# Patient Record
Sex: FEMALE | Race: WHITE | NOT HISPANIC OR LATINO | Employment: OTHER | URBAN - METROPOLITAN AREA
[De-identification: names, ages, dates, MRNs, and addresses within clinical notes are randomized per-mention and may not be internally consistent; named-entity substitution may affect disease eponyms.]

---

## 2018-08-20 ENCOUNTER — HOSPITAL ENCOUNTER (OUTPATIENT)
Dept: LAB | Facility: MEDICAL CENTER | Age: 48
End: 2018-08-20
Attending: SPECIALIST
Payer: MEDICARE

## 2018-08-20 PROCEDURE — 87591 N.GONORRHOEAE DNA AMP PROB: CPT | Mod: GA

## 2018-08-20 PROCEDURE — 87624 HPV HI-RISK TYP POOLED RSLT: CPT

## 2018-08-20 PROCEDURE — 87491 CHLMYD TRACH DNA AMP PROBE: CPT | Mod: GA

## 2018-08-20 PROCEDURE — 88175 CYTOPATH C/V AUTO FLUID REDO: CPT

## 2018-08-21 LAB
C TRACH DNA GENITAL QL NAA+PROBE: NEGATIVE
CYTOLOGY REG CYTOL: NORMAL
HPV HR 12 DNA CVX QL NAA+PROBE: NEGATIVE
HPV16 DNA SPEC QL NAA+PROBE: NEGATIVE
HPV18 DNA SPEC QL NAA+PROBE: NEGATIVE
N GONORRHOEA DNA GENITAL QL NAA+PROBE: NEGATIVE
SPECIMEN SOURCE: NORMAL
SPECIMEN SOURCE: NORMAL

## 2019-08-02 ENCOUNTER — APPOINTMENT (OUTPATIENT)
Dept: RADIOLOGY | Facility: MEDICAL CENTER | Age: 49
End: 2019-08-02
Attending: EMERGENCY MEDICINE
Payer: MEDICARE

## 2019-08-02 ENCOUNTER — HOSPITAL ENCOUNTER (EMERGENCY)
Facility: MEDICAL CENTER | Age: 49
End: 2019-08-02
Attending: EMERGENCY MEDICINE
Payer: MEDICARE

## 2019-08-02 VITALS
OXYGEN SATURATION: 95 % | DIASTOLIC BLOOD PRESSURE: 72 MMHG | BODY MASS INDEX: 35.74 KG/M2 | WEIGHT: 194.22 LBS | HEART RATE: 72 BPM | HEIGHT: 62 IN | TEMPERATURE: 97.6 F | RESPIRATION RATE: 16 BRPM | SYSTOLIC BLOOD PRESSURE: 111 MMHG

## 2019-08-02 DIAGNOSIS — K29.00 ACUTE GASTRITIS WITHOUT HEMORRHAGE, UNSPECIFIED GASTRITIS TYPE: ICD-10-CM

## 2019-08-02 DIAGNOSIS — R10.13 EPIGASTRIC PAIN: ICD-10-CM

## 2019-08-02 LAB
ALBUMIN SERPL BCP-MCNC: 4.5 G/DL (ref 3.2–4.9)
ALBUMIN/GLOB SERPL: 1.4 G/DL
ALP SERPL-CCNC: 66 U/L (ref 30–99)
ALT SERPL-CCNC: 41 U/L (ref 2–50)
ANION GAP SERPL CALC-SCNC: 12 MMOL/L (ref 0–11.9)
AST SERPL-CCNC: 24 U/L (ref 12–45)
BASOPHILS # BLD AUTO: 0.4 % (ref 0–1.8)
BASOPHILS # BLD: 0.02 K/UL (ref 0–0.12)
BILIRUB SERPL-MCNC: 1.1 MG/DL (ref 0.1–1.5)
BUN SERPL-MCNC: 14 MG/DL (ref 8–22)
CALCIUM SERPL-MCNC: 9.5 MG/DL (ref 8.4–10.2)
CHLORIDE SERPL-SCNC: 101 MMOL/L (ref 96–112)
CO2 SERPL-SCNC: 24 MMOL/L (ref 20–33)
CREAT SERPL-MCNC: 0.98 MG/DL (ref 0.5–1.4)
EKG IMPRESSION: NORMAL
EOSINOPHIL # BLD AUTO: 0.03 K/UL (ref 0–0.51)
EOSINOPHIL NFR BLD: 0.6 % (ref 0–6.9)
ERYTHROCYTE [DISTWIDTH] IN BLOOD BY AUTOMATED COUNT: 42.8 FL (ref 35.9–50)
GLOBULIN SER CALC-MCNC: 3.2 G/DL (ref 1.9–3.5)
GLUCOSE SERPL-MCNC: 122 MG/DL (ref 65–99)
HCT VFR BLD AUTO: 48.5 % (ref 37–47)
HGB BLD-MCNC: 16.2 G/DL (ref 12–16)
IMM GRANULOCYTES # BLD AUTO: 0.01 K/UL (ref 0–0.11)
IMM GRANULOCYTES NFR BLD AUTO: 0.2 % (ref 0–0.9)
LYMPHOCYTES # BLD AUTO: 1.32 K/UL (ref 1–4.8)
LYMPHOCYTES NFR BLD: 26.1 % (ref 22–41)
MCH RBC QN AUTO: 29 PG (ref 27–33)
MCHC RBC AUTO-ENTMCNC: 33.4 G/DL (ref 33.6–35)
MCV RBC AUTO: 86.8 FL (ref 81.4–97.8)
MONOCYTES # BLD AUTO: 0.36 K/UL (ref 0–0.85)
MONOCYTES NFR BLD AUTO: 7.1 % (ref 0–13.4)
NEUTROPHILS # BLD AUTO: 3.31 K/UL (ref 2–7.15)
NEUTROPHILS NFR BLD: 65.6 % (ref 44–72)
NRBC # BLD AUTO: 0 K/UL
NRBC BLD-RTO: 0 /100 WBC
PLATELET # BLD AUTO: 205 K/UL (ref 164–446)
PMV BLD AUTO: 9.5 FL (ref 9–12.9)
POTASSIUM SERPL-SCNC: 3.9 MMOL/L (ref 3.6–5.5)
PROT SERPL-MCNC: 7.7 G/DL (ref 6–8.2)
RBC # BLD AUTO: 5.59 M/UL (ref 4.2–5.4)
SODIUM SERPL-SCNC: 137 MMOL/L (ref 135–145)
TROPONIN T SERPL-MCNC: <6 NG/L (ref 6–19)
TROPONIN T SERPL-MCNC: <6 NG/L (ref 6–19)
WBC # BLD AUTO: 5.1 K/UL (ref 4.8–10.8)

## 2019-08-02 PROCEDURE — 84484 ASSAY OF TROPONIN QUANT: CPT

## 2019-08-02 PROCEDURE — 85025 COMPLETE CBC W/AUTO DIFF WBC: CPT

## 2019-08-02 PROCEDURE — 93005 ELECTROCARDIOGRAM TRACING: CPT | Performed by: EMERGENCY MEDICINE

## 2019-08-02 PROCEDURE — 93005 ELECTROCARDIOGRAM TRACING: CPT

## 2019-08-02 PROCEDURE — 99285 EMERGENCY DEPT VISIT HI MDM: CPT

## 2019-08-02 PROCEDURE — 71045 X-RAY EXAM CHEST 1 VIEW: CPT

## 2019-08-02 PROCEDURE — 36415 COLL VENOUS BLD VENIPUNCTURE: CPT

## 2019-08-02 PROCEDURE — 700102 HCHG RX REV CODE 250 W/ 637 OVERRIDE(OP): Performed by: EMERGENCY MEDICINE

## 2019-08-02 PROCEDURE — 80053 COMPREHEN METABOLIC PANEL: CPT

## 2019-08-02 PROCEDURE — A9270 NON-COVERED ITEM OR SERVICE: HCPCS | Performed by: EMERGENCY MEDICINE

## 2019-08-02 RX ORDER — LORAZEPAM 1 MG/1
1 TABLET ORAL
Status: SHIPPED | COMMUNITY
End: 2019-11-28

## 2019-08-02 RX ORDER — OMEPRAZOLE 20 MG/1
20 CAPSULE, DELAYED RELEASE ORAL DAILY
Qty: 30 CAP | Refills: 0 | Status: SHIPPED | OUTPATIENT
Start: 2019-08-02 | End: 2019-11-28

## 2019-08-02 RX ADMIN — LIDOCAINE HYDROCHLORIDE 30 ML: 20 SOLUTION OROPHARYNGEAL at 07:10

## 2019-08-02 ASSESSMENT — ENCOUNTER SYMPTOMS
VOMITING: 1
COUGH: 0
FLANK PAIN: 0
CONSTIPATION: 0
PALPITATIONS: 0
CHILLS: 0
BACK PAIN: 1
FALLS: 0
ABDOMINAL PAIN: 1
HEADACHES: 0
DIARRHEA: 0
NAUSEA: 1
SORE THROAT: 0
FEVER: 0
SHORTNESS OF BREATH: 0

## 2019-08-02 NOTE — ED PROVIDER NOTES
ED Provider Note    ED Provider Note    Primary care provider: Violet Mccoy M.D.  Means of arrival: POV  History obtained from: Patient  History limited by: None    CHIEF COMPLAINT  Chief Complaint   Patient presents with   • Chest Pain     mid chest, pain started day before yesterday after throwing up, pt has beeing taking gas-x w/ no relief. denies any n/v, SOB or radiation of pain        HPI  Maggie Milligan is a 48 y.o. female who presents to the Emergency Department with a chief complaint of epigastric pain.  Despite triage note, patient does not complain of chest pain to me.  She states that she never drinks, but on Wednesday evening for reasons that are unclear to her, she states she went to the store about 2 beers came home and drank them.  She then had eggs and later that evening, vomited.  Since then she has been having epigastric pain.  She is been taking Gas-X which does provide some relief.  She took it last night and it helped until about 2:00 and then she vomited again.  She denies having any shortness of breath, diaphoresis.  She is not nauseated at this time.  No jaw or neck pain.  No pain to her shoulders.  She occasionally, has pain from the epigastric area that radiates across her abdomen and around to her back, although she does not have the symptoms at this time.  She has no lower abdominal pain.  No cough or congestion, URI symptoms.  No sore throat.  No fever.  She has denies any diarrhea or urinary complaints.  She does not smoke or use any drugs.  She has no significant history of heart disease in her family.  She is not hypertensive or diabetic.    REVIEW OF SYSTEMS  Review of Systems   Constitutional: Negative for chills and fever.   HENT: Negative for congestion and sore throat.    Respiratory: Negative for cough and shortness of breath.    Cardiovascular: Negative for chest pain, palpitations and leg swelling.   Gastrointestinal: Positive for abdominal pain, nausea and vomiting.  "Negative for constipation and diarrhea.   Genitourinary: Negative for dysuria and flank pain.   Musculoskeletal: Positive for back pain. Negative for falls.   Skin: Negative for rash.   Neurological: Negative for headaches.   All other systems reviewed and are negative.      PAST MEDICAL HISTORY   has a past medical history of Dental disorder, Heart burn, Indigestion, Other specified symptom associated with female genital organs, and Pain (06-13-13).    SURGICAL HISTORY   has a past surgical history that includes supracervical hysterectomy scope (11/7/2012); pelviscopy (6/18/2013); hysterectomy laparoscopy; and heaven by laparoscopy (1/14/2014).    SOCIAL HISTORY  Social History     Tobacco Use   • Smoking status: Never Smoker   • Smokeless tobacco: Never Used   Substance Use Topics   • Alcohol use: No   • Drug use: No      Social History     Substance and Sexual Activity   Drug Use No       FAMILY HISTORY  Family History   Problem Relation Age of Onset   • Cancer Maternal Grandmother        CURRENT MEDICATIONS  Home Medications     Reviewed by Reno Smith (Pharmacy Tech) on 08/02/19 at 0730  Med List Status: Complete   Medication Last Dose Status   LORazepam (ATIVAN) 1 MG Tab 8/1/2019 Active   Omega-3 Fatty Acids (OMEGA 3 PO) 8/1/2019 Active                ALLERGIES  No Known Allergies    PHYSICAL EXAM  VITAL SIGNS: /72   Pulse 72   Temp 36.4 °C (97.6 °F) (Temporal)   Resp 16   Ht 1.575 m (5' 2\")   Wt 88.1 kg (194 lb 3.6 oz)   LMP 10/29/2012   SpO2 95%   BMI 35.52 kg/m²   Vitals reviewed.  Constitutional: Patient is oriented to person, place, and time. Appears well-developed and well-nourished. No distress.    Head: Normocephalic and atraumatic.   Ears: Normal external ears bilaterally.   Mouth/Throat: Oropharynx is clear and moist  Eyes: Conjunctivae are normal. Pupils are equal, round, and reactive to light.   Neck: Normal range of motion. Neck supple.  Cardiovascular: Normal rate, " regular rhythm and normal heart sounds. Normal peripheral pulses.  Pulmonary/Chest: Effort normal and breath sounds normal. No respiratory distress, no wheezes, rhonchi, or rales. No chest wall tenderness.  Abdominal: Soft. Bowel sounds are normal. There is epigastric tenderness. No rebound or guarding, or peritoneal signs. No CVA tenderness.  Musculoskeletal: No edema and no tenderness.   Neurological: No focal deficits.   Skin: Skin is warm and dry. No erythema. No pallor.   Psychiatric: Patient has a normal mood and affect.     LABS  Results for orders placed or performed during the hospital encounter of 08/02/19   CBC with Differential   Result Value Ref Range    WBC 5.1 4.8 - 10.8 K/uL    RBC 5.59 (H) 4.20 - 5.40 M/uL    Hemoglobin 16.2 (H) 12.0 - 16.0 g/dL    Hematocrit 48.5 (H) 37.0 - 47.0 %    MCV 86.8 81.4 - 97.8 fL    MCH 29.0 27.0 - 33.0 pg    MCHC 33.4 (L) 33.6 - 35.0 g/dL    RDW 42.8 35.9 - 50.0 fL    Platelet Count 205 164 - 446 K/uL    MPV 9.5 9.0 - 12.9 fL    Neutrophils-Polys 65.60 44.00 - 72.00 %    Lymphocytes 26.10 22.00 - 41.00 %    Monocytes 7.10 0.00 - 13.40 %    Eosinophils 0.60 0.00 - 6.90 %    Basophils 0.40 0.00 - 1.80 %    Immature Granulocytes 0.20 0.00 - 0.90 %    Nucleated RBC 0.00 /100 WBC    Neutrophils (Absolute) 3.31 2.00 - 7.15 K/uL    Lymphs (Absolute) 1.32 1.00 - 4.80 K/uL    Monos (Absolute) 0.36 0.00 - 0.85 K/uL    Eos (Absolute) 0.03 0.00 - 0.51 K/uL    Baso (Absolute) 0.02 0.00 - 0.12 K/uL    Immature Granulocytes (abs) 0.01 0.00 - 0.11 K/uL    NRBC (Absolute) 0.00 K/uL   Complete Metabolic Panel (CMP)   Result Value Ref Range    Sodium 137 135 - 145 mmol/L    Potassium 3.9 3.6 - 5.5 mmol/L    Chloride 101 96 - 112 mmol/L    Co2 24 20 - 33 mmol/L    Anion Gap 12.0 (H) 0.0 - 11.9    Glucose 122 (H) 65 - 99 mg/dL    Bun 14 8 - 22 mg/dL    Creatinine 0.98 0.50 - 1.40 mg/dL    Calcium 9.5 8.4 - 10.2 mg/dL    AST(SGOT) 24 12 - 45 U/L    ALT(SGPT) 41 2 - 50 U/L    Alkaline  Phosphatase 66 30 - 99 U/L    Total Bilirubin 1.1 0.1 - 1.5 mg/dL    Albumin 4.5 3.2 - 4.9 g/dL    Total Protein 7.7 6.0 - 8.2 g/dL    Globulin 3.2 1.9 - 3.5 g/dL    A-G Ratio 1.4 g/dL   Troponin   Result Value Ref Range    Troponin T <6 6 - 19 ng/L   ESTIMATED GFR   Result Value Ref Range    GFR If African American >60 >60 mL/min/1.73 m 2    GFR If Non African American 60 >60 mL/min/1.73 m 2   TROPONIN   Result Value Ref Range    Troponin T <6 6 - 19 ng/L   EKG   Result Value Ref Range    Report       [facility] Emergency Dept.    Test Date:  2019  Pt Name:    GONZALEZ SIERRA                  Department: [facility]  MRN:        [id number]                      [facility]  Gender:     Female                       Technician: [id number]  :        1970                   Requested By:ER TRIAGE PROTOCOL  Order #:    [id number]                    Reading MD:    Measurements  Intervals                                Axis  Rate:       74                           P:          50  AR:         168                          QRS:        40  QRSD:       76                           T:          25  QT:         372  QTc:        413    Interpretive Statements  SINUS RHYTHM  EARLY PRECORDIAL R/S TRANSITION  BASELINE WANDER IN LEAD(S) I,II,aVR  Compared to ECG 2015 07:10:28  No significant changes         All labs reviewed by me.    EKG Interpretation  Interpreted by me    Rhythm: normal sinus   Rate: 74  Axis: normal  Ectopy: none  Conduction: normal  ST Segments: no acute change  T Waves: no acute change  Q Waves: none    Clinical Impression: Comparison made to prior EKG from 2015.  Sinus rhythm at that time.  No acute changes and normal EKG    RADIOLOGY  DX-CHEST-PORTABLE (1 VIEW)   Final Result      No acute cardiac or pulmonary abnormalities are identified.        The radiologist's interpretation of all radiological studies have been reviewed by me.    COURSE & MEDICAL DECISION  MAKING  Pertinent Labs & Imaging studies reviewed. (See chart for details)    Obtained and reviewed past medical records.  Patient's last encounter was 3 years ago in August 2015 he was seen for in the outpatient setting for anxiety and depression.  At that time, patient noted to have an ED visit for anxiety also noted to be taking Suboxone.  Prior to that, patient seen in the emergency department July 2015 for dizziness.    6:50 AM - Patient seen and examined at bedside.  This is a pleasant 48-year-old female who presents with epigastric pain.  She is had 2 episodes of vomiting over the last 48 hours.  Is not currently nauseated.  She does not have significant anginal symptoms.  Her EKG is reassuring.  I suspect that this is likely not cardiac in its etiology and the patient agrees although she was worried.  I have suggested further screening, for risk stratification.  Heart score is 1.  She is PERC negative. Patient will be treated with a GI cocktail.  IV started, labs drawn per nursing protocols.    Differential diagnosis includes but not limited to: Gastritis, pancreatitis, cholecystitis, peptic ulcer disease, less likely ACS.    8:07 AM data reviewed.  Patient's white blood cell counts normal 5.1.  H&H 16 and 48.  There is no neutrophilic shift.  Normal platelet count.  Chemistry shows a slightly elevated anion 12, glucose 122 the remainder of her CMP is normal.  Troponin T is normal.  Awaiting delta troponin.    9:23 AM patient's reevaluated the bedside.  She is feeling markedly better.  Much improvement after GI cocktail.  She has had 2 negative troponins and a reassuring EKG.  At this point, given her low heart score, I do not suspect that this is cardiac in its etiology.  Greater suspicion for gastritis.  I recommended PPI which will be prescribed.  She will follow-up with her PCP.  She is given strict return precautions.  She is stable at this time.    She is discharged in stable condition.    FINAL  IMPRESSION  1. Acute gastritis without hemorrhage, unspecified gastritis type    2. Epigastric pain

## 2019-08-02 NOTE — ED NOTES
Report from PEDRITO Tilley. Pt resting comfortably at this time, NAD. Medicated as ordered and updated on POC. Sinus Rhythm on monitor. Will cont to monitor

## 2019-08-02 NOTE — ED NOTES
Discharge information provided. Pt verbalized understanding of discharge instructions to follow up with PCP and to return to ER if condition worsens.  Pt ambulated out of ER in a steady gait, no additional questions or concerns. Educated on new medication and f/u. Paper script given

## 2019-08-02 NOTE — ED TRIAGE NOTES
"Chief Complaint   Patient presents with   • Chest Pain     mid chest, pain started day before yesterday after throwing up, pt has beeing taking gas-x w/ no relief. denies any n/v, SOB or radiation of pain      /85   Pulse 84   Temp 36.4 °C (97.6 °F) (Temporal)   Resp 19   Ht 1.575 m (5' 2\")   Wt 88.1 kg (194 lb 3.6 oz)   LMP 10/29/2012   SpO2 97%   BMI 35.52 kg/m²       "

## 2019-11-28 ENCOUNTER — HOSPITAL ENCOUNTER (EMERGENCY)
Facility: MEDICAL CENTER | Age: 49
End: 2019-11-28
Attending: EMERGENCY MEDICINE
Payer: MEDICARE

## 2019-11-28 VITALS
HEART RATE: 78 BPM | TEMPERATURE: 98 F | SYSTOLIC BLOOD PRESSURE: 110 MMHG | DIASTOLIC BLOOD PRESSURE: 70 MMHG | OXYGEN SATURATION: 98 % | RESPIRATION RATE: 17 BRPM | BODY MASS INDEX: 36.67 KG/M2 | HEIGHT: 62 IN | WEIGHT: 199.3 LBS

## 2019-11-28 DIAGNOSIS — K08.89 PAIN, DENTAL: ICD-10-CM

## 2019-11-28 PROCEDURE — A9270 NON-COVERED ITEM OR SERVICE: HCPCS | Performed by: EMERGENCY MEDICINE

## 2019-11-28 PROCEDURE — 99283 EMERGENCY DEPT VISIT LOW MDM: CPT

## 2019-11-28 PROCEDURE — 700102 HCHG RX REV CODE 250 W/ 637 OVERRIDE(OP): Performed by: EMERGENCY MEDICINE

## 2019-11-28 RX ORDER — AMOXICILLIN 250 MG/1
500 CAPSULE ORAL ONCE
Status: COMPLETED | OUTPATIENT
Start: 2019-11-28 | End: 2019-11-28

## 2019-11-28 RX ORDER — AMOXICILLIN 500 MG/1
500 CAPSULE ORAL 3 TIMES DAILY
Qty: 21 CAP | Refills: 0 | Status: SHIPPED | OUTPATIENT
Start: 2019-11-28 | End: 2019-12-05

## 2019-11-28 RX ORDER — AMOXICILLIN 500 MG/1
500 CAPSULE ORAL 3 TIMES DAILY
Qty: 21 CAP | Refills: 0 | Status: SHIPPED | OUTPATIENT
Start: 2019-11-28 | End: 2019-11-28 | Stop reason: SDUPTHER

## 2019-11-28 RX ORDER — IBUPROFEN 200 MG
400 TABLET ORAL EVERY 6 HOURS PRN
Status: SHIPPED | COMMUNITY
End: 2021-07-30

## 2019-11-28 RX ADMIN — AMOXICILLIN 500 MG: 250 CAPSULE ORAL at 10:58

## 2019-11-28 NOTE — ED NOTES
Medication reconciliation updated and complete per pt at bedside  Allergies have been verified   No oral ABX within the last 14 days  Pt home pharmacy:[facility]

## 2019-11-28 NOTE — ED PROVIDER NOTES
"ED Provider Note      CHIEF COMPLAINT  Chief Complaint   Patient presents with   • Dental Pain       HPI  Maggie Milligan is a 49 y.o. female who presents with dental pain. Pain started approximately week ago. Pain is located right upper front tooth. Pain is severe. Constant. It is getting worse. No fevers. No neck stiffness or headache. Swelling slightly present.    REVIEW OF SYSTEMS  Pertinent negative: No fevers or drainage,    See HPI    PAST MEDICAL HISTORY  Past Medical History:   Diagnosis Date   • Dental disorder    • Heart burn    • Indigestion    • Other specified symptom associated with female genital organs     pelvic pain, cysts   • Pain 06-13-13    right pelvic area, 3/10       SOCIAL HISTORY  Social History     Tobacco Use   • Smoking status: Never Smoker   • Smokeless tobacco: Never Used   Substance Use Topics   • Alcohol use: Yes     Comment: occ   • Drug use: No       ALLERGIES  No Known Allergies    PHYSICAL EXAM  VITAL SIGNS: /65   Pulse 82   Temp 36.7 °C (98 °F) (Temporal)   Resp 16   Ht 1.575 m (5' 2\")   Wt 90.4 kg (199 lb 4.7 oz)   LMP 10/29/2012   SpO2 93%   BMI 36.45 kg/m²   Constitutional: Well developed, Well nourished, No acute distress, Non-toxic appearance.   HENT:  Atraumatic, Normocephalic. Bilateral external ears normal, Oropharynx with multiple dental caries, the right front upper tooth is cracked, gray, tenderness palpation at the base, no soft tissue swelling, no fluctuance.  Eyes: Grossly Normal inspection  Neck: Normal range of motion  Cardiovascular: Normal heart rate  Thorax & Lungs: No respiratory distress  Skin: No rash.     COURSE & MEDICAL DECISION MAKING  Patient with dental caries. No sign of abscess. We'll treat with amoxicillin. I have advised followup with dentist as soon as possible. Asked to return to the emergency department for swelling, high fevers, or concern.    Patient referred to primary provider for blood pressure management    FINAL " IMPRESSION  1.  Toothache      This dictation was created using voice recognition software. The accuracy of the dictation is limited to the abilities of the software.   The nursing notes were reviewed and certain aspects of this information were incorporated into this note.      Electronically signed by: Selwyn John, 11/28/2019 10:51 AM

## 2019-11-28 NOTE — ED TRIAGE NOTES
"Chief Complaint   Patient presents with   • Dental Pain     Right upper front tooth, blackish grey in color. Pt reports no dentist and pain x a few days.   /65   Pulse 82   Temp 36.7 °C (98 °F) (Temporal)   Resp 16   Ht 1.575 m (5' 2\")   Wt 90.4 kg (199 lb 4.7 oz)   SpO2 93%   Pt informed of wait times. Educated on triage process.  Asked to return to triage RN for any new or worsening of symptoms. Thanked for patience.        "

## 2020-01-31 ENCOUNTER — HOSPITAL ENCOUNTER (OUTPATIENT)
Dept: LAB | Facility: MEDICAL CENTER | Age: 50
End: 2020-01-31
Attending: SPECIALIST
Payer: MEDICARE

## 2020-01-31 PROCEDURE — 88175 CYTOPATH C/V AUTO FLUID REDO: CPT

## 2020-01-31 PROCEDURE — 87624 HPV HI-RISK TYP POOLED RSLT: CPT

## 2020-02-01 LAB
CYTOLOGY REG CYTOL: NORMAL
HPV HR 12 DNA CVX QL NAA+PROBE: NEGATIVE
HPV16 DNA SPEC QL NAA+PROBE: NEGATIVE
HPV18 DNA SPEC QL NAA+PROBE: NEGATIVE
SPECIMEN SOURCE: NORMAL

## 2021-07-10 ENCOUNTER — HOSPITAL ENCOUNTER (OUTPATIENT)
Dept: LAB | Facility: MEDICAL CENTER | Age: 51
End: 2021-07-10
Attending: FAMILY MEDICINE
Payer: MEDICARE

## 2021-07-10 LAB
ALBUMIN SERPL BCP-MCNC: 4.9 G/DL (ref 3.2–4.9)
ALBUMIN/GLOB SERPL: 1.9 G/DL
ALP SERPL-CCNC: 84 U/L (ref 30–99)
ALT SERPL-CCNC: 110 U/L (ref 2–50)
ANION GAP SERPL CALC-SCNC: 15 MMOL/L (ref 7–16)
AST SERPL-CCNC: 23 U/L (ref 12–45)
BASOPHILS # BLD AUTO: 0.6 % (ref 0–1.8)
BASOPHILS # BLD: 0.02 K/UL (ref 0–0.12)
BILIRUB SERPL-MCNC: 1.4 MG/DL (ref 0.1–1.5)
BUN SERPL-MCNC: 17 MG/DL (ref 8–22)
CALCIUM SERPL-MCNC: 9.6 MG/DL (ref 8.5–10.5)
CHLORIDE SERPL-SCNC: 103 MMOL/L (ref 96–112)
CHOLEST SERPL-MCNC: 279 MG/DL (ref 100–199)
CO2 SERPL-SCNC: 23 MMOL/L (ref 20–33)
CREAT SERPL-MCNC: 0.76 MG/DL (ref 0.5–1.4)
EOSINOPHIL # BLD AUTO: 0.05 K/UL (ref 0–0.51)
EOSINOPHIL NFR BLD: 1.5 % (ref 0–6.9)
ERYTHROCYTE [DISTWIDTH] IN BLOOD BY AUTOMATED COUNT: 46 FL (ref 35.9–50)
FASTING STATUS PATIENT QL REPORTED: NORMAL
GLOBULIN SER CALC-MCNC: 2.6 G/DL (ref 1.9–3.5)
GLUCOSE SERPL-MCNC: 89 MG/DL (ref 65–99)
HCT VFR BLD AUTO: 45.1 % (ref 37–47)
HDLC SERPL-MCNC: 70 MG/DL
HGB BLD-MCNC: 14.8 G/DL (ref 12–16)
IMM GRANULOCYTES # BLD AUTO: 0 K/UL (ref 0–0.11)
IMM GRANULOCYTES NFR BLD AUTO: 0 % (ref 0–0.9)
LDLC SERPL CALC-MCNC: 200 MG/DL
LYMPHOCYTES # BLD AUTO: 1.12 K/UL (ref 1–4.8)
LYMPHOCYTES NFR BLD: 34.1 % (ref 22–41)
MCH RBC QN AUTO: 29.6 PG (ref 27–33)
MCHC RBC AUTO-ENTMCNC: 32.8 G/DL (ref 33.6–35)
MCV RBC AUTO: 90.2 FL (ref 81.4–97.8)
MONOCYTES # BLD AUTO: 0.26 K/UL (ref 0–0.85)
MONOCYTES NFR BLD AUTO: 7.9 % (ref 0–13.4)
NEUTROPHILS # BLD AUTO: 1.83 K/UL (ref 2–7.15)
NEUTROPHILS NFR BLD: 55.9 % (ref 44–72)
NRBC # BLD AUTO: 0 K/UL
NRBC BLD-RTO: 0 /100 WBC
PLATELET # BLD AUTO: 181 K/UL (ref 164–446)
PMV BLD AUTO: 10.1 FL (ref 9–12.9)
POTASSIUM SERPL-SCNC: 4.1 MMOL/L (ref 3.6–5.5)
PROT SERPL-MCNC: 7.5 G/DL (ref 6–8.2)
RBC # BLD AUTO: 5 M/UL (ref 4.2–5.4)
SODIUM SERPL-SCNC: 141 MMOL/L (ref 135–145)
TRIGL SERPL-MCNC: 45 MG/DL (ref 0–149)
WBC # BLD AUTO: 3.3 K/UL (ref 4.8–10.8)

## 2021-07-10 PROCEDURE — 80053 COMPREHEN METABOLIC PANEL: CPT

## 2021-07-10 PROCEDURE — 85025 COMPLETE CBC W/AUTO DIFF WBC: CPT

## 2021-07-10 PROCEDURE — 80061 LIPID PANEL: CPT | Mod: GA

## 2021-07-10 PROCEDURE — 36415 COLL VENOUS BLD VENIPUNCTURE: CPT

## 2021-07-30 ENCOUNTER — HOSPITAL ENCOUNTER (EMERGENCY)
Facility: MEDICAL CENTER | Age: 51
End: 2021-07-30
Attending: EMERGENCY MEDICINE
Payer: MEDICARE

## 2021-07-30 VITALS
BODY MASS INDEX: 29.01 KG/M2 | DIASTOLIC BLOOD PRESSURE: 72 MMHG | WEIGHT: 157.63 LBS | HEART RATE: 90 BPM | SYSTOLIC BLOOD PRESSURE: 116 MMHG | RESPIRATION RATE: 16 BRPM | TEMPERATURE: 97.6 F | HEIGHT: 62 IN | OXYGEN SATURATION: 99 %

## 2021-07-30 DIAGNOSIS — S89.92XA INJURY OF LEFT KNEE, INITIAL ENCOUNTER: ICD-10-CM

## 2021-07-30 PROCEDURE — 99281 EMR DPT VST MAYX REQ PHY/QHP: CPT

## 2021-07-30 RX ORDER — LORAZEPAM 1 MG/1
1 TABLET ORAL EVERY EVENING
COMMUNITY

## 2021-07-30 RX ORDER — IBUPROFEN 600 MG/1
600 TABLET ORAL ONCE
Status: DISCONTINUED | OUTPATIENT
Start: 2021-07-30 | End: 2021-07-30 | Stop reason: HOSPADM

## 2021-07-30 ASSESSMENT — FIBROSIS 4 INDEX: FIB4 SCORE: 0.63

## 2021-07-30 NOTE — DISCHARGE INSTRUCTIONS
Please follow-up with your primary care physician, urgent care, [facility], or this emergency department for complete reassessment of your left knee, and diagnostic imaging if necessary.  Return to the emergency department immediately if you develop any new or worsening symptoms including worsening pain, swelling, redness, fevers, numbness or tingling to the leg, or if you have any further concerns.  Additionally, please return in 24 to 48 hours if you are not able to schedule a close follow-up appointment within 2 to 3 days, and if your symptoms are not improving.  You may use the crutches and Ace wrap for comfort, you may put weight on the knee as tolerated.

## 2021-07-30 NOTE — ED PROVIDER NOTES
ED Provider Note    Chief Complaint:   Left knee swelling    HPI:  Maggie Milligan is a very pleasant 50-year-old woman who presents to the emergency department for evaluation of left knee pain.  She does not run usually, but went for a run about a week or 2 ago.  She reports running about 1 to 2 miles with her .  About 5 days ago she noticed a persistent pain localized to the left knee.  She also noticed some swelling surrounding the patella.  Pain is exacerbated by moving the knee, and weightbearing.  She became concerned because her symptoms were not improving, so she presented to the emergency department for further evaluation.  She does not report any distinct injury to the knee, and did not notice any pain or injury while she was running.  She has had no pain localized to the posterior calf or posterior thigh, no pain localized to the posterior portion of the knee.  She does have some visible swelling to the anterior aspect of the knee as compared to the right knee which is uninjured at this time.  She has no history of left knee injury, no history of chronic left knee pain.  She has no significant past medical history, is not currently on any anticoagulation or antiplatelet agents.  She has had no associated fevers.  She is able to flex and extend the knee.    Review of Systems:  See HPI for pertinent positives and negatives.     Past Medical History:   has a past medical history of Dental disorder, Heart burn, Indigestion, Other specified symptom associated with female genital organs, and Pain (06-13-13).    Social History:  Social History     Tobacco Use   • Smoking status: Never Smoker   • Smokeless tobacco: Never Used   Vaping Use   • Vaping Use: Never used   Substance and Sexual Activity   • Alcohol use: Yes     Comment: occ   • Drug use: No   • Sexual activity: Not on file       Surgical History:   has a past surgical history that includes supracervical hysterectomy scope (11/7/2012); pelviscopy  "(6/18/2013); hysterectomy laparoscopy; and heaven by laparoscopy (1/14/2014).    Current Medications:  Home Medications     Reviewed by Laura Dodson R.N. (Registered Nurse) on 07/30/21 at 0804  Med List Status: Complete   Medication Last Dose Status   LORazepam (ATIVAN) 1 MG Tab 7/29/2021 Active   Omega-3 Fatty Acids (OMEGA 3 PO) 7/29/2021 Active                Allergies:  No Known Allergies    Physical Exam:  Vital Signs: /72   Pulse 90   Temp 36.4 °C (97.6 °F) (Temporal)   Resp 16   Ht 1.575 m (5' 2\")   Wt 71.5 kg (157 lb 10.1 oz)   LMP 10/29/2012   SpO2 99%   BMI 28.83 kg/m²   Constitutional: Alert, no acute distress  HENT: Atraumatic  Neck: Normal range of motion  Cardiovascular: Left lower extremity warm, well perfused  Pulmonary: Normal work of breathing  Skin: Left knee with normal appearing overlying skin, no redness, no cellulitis, no evidence of abscess, no lacerations nor abrasions  Musculoskeletal: Left knee with very mild swelling below the patella, possibly due to soft tissue swelling or effusion, she is able to flex and extend the knee without evidence of septic joint.  Soft compartments throughout the left lower extremity.  No posterior calf or posterior thigh tenderness to palpation, no tenderness on palpation of the popliteal fossa.  Neurologic: Left lower extremity with normal sensory and motor function    Medical records reviewed for continuity of care.  No recent visits for similar symptoms.    Labs:  Labs Reviewed - No data to display    Radiology:  No orders to display        ED Medications Administered:  Medications - No data to display    MDM:  Ms. Milligan presents to the emergency department today for evaluation of left knee pain that began 5 days ago.  She has no evidence of neurovascular compromise, no severe injury concerning for fracture.  She does have a mild effusion, possibly due to overuse or soft tissue injury, ligamentous or meniscal injury is a possibility as " well.    Her pain was treated with ibuprofen in the emergency department.  Plain films ordered.  However there was a prolonged wait as the emergency department was quite busy, she decided that she does not want to wait for plain films, and that she can likely be seen more conveniently at an urgent care, or primary care clinic.  I believe this is a reasonable option.  She was provided with outpatient follow-up information and return precautions, however eloped prior to receiving this information.  Additionally eloped before I could discuss a secondary plan with her.    Personal protective equipment including N95 surgical respirator, goggles, and gloves were used during this encounter.       Disposition:  Eloped prior to discharge    Final Impression:  1. Injury of left knee, initial encounter        Electronically signed by: Sayda Goodman MD, 7/31/2021 9:38 AM

## 2021-07-30 NOTE — ED TRIAGE NOTES
Pt c/o   Chief Complaint   Patient presents with   • Knee Pain     ran a few days ago and now has knee pain     Pt reports she has been on the keto diet and losing weight and decided to go for a run a few nights ago, now she has knee pain.

## 2021-07-30 NOTE — ED NOTES
Pt states she doesn't want to wait for xray, pt left prior to giving pt d/c papers and crutches/ wrap

## 2023-01-06 ENCOUNTER — HOSPITAL ENCOUNTER (EMERGENCY)
Facility: MEDICAL CENTER | Age: 53
End: 2023-01-06
Attending: EMERGENCY MEDICINE
Payer: MEDICARE

## 2023-01-06 VITALS
OXYGEN SATURATION: 97 % | WEIGHT: 162.7 LBS | DIASTOLIC BLOOD PRESSURE: 80 MMHG | SYSTOLIC BLOOD PRESSURE: 123 MMHG | RESPIRATION RATE: 16 BRPM | TEMPERATURE: 99.4 F | HEART RATE: 85 BPM | BODY MASS INDEX: 29.94 KG/M2 | HEIGHT: 62 IN

## 2023-01-06 DIAGNOSIS — L08.9 FINGER INFECTION: Primary | ICD-10-CM

## 2023-01-06 DIAGNOSIS — L03.019 FELON OF FINGER: ICD-10-CM

## 2023-01-06 PROCEDURE — 700102 HCHG RX REV CODE 250 W/ 637 OVERRIDE(OP): Performed by: EMERGENCY MEDICINE

## 2023-01-06 PROCEDURE — A9270 NON-COVERED ITEM OR SERVICE: HCPCS | Performed by: EMERGENCY MEDICINE

## 2023-01-06 PROCEDURE — 99283 EMERGENCY DEPT VISIT LOW MDM: CPT

## 2023-01-06 RX ORDER — CEPHALEXIN 500 MG/1
500 CAPSULE ORAL 3 TIMES DAILY
Qty: 21 CAPSULE | Refills: 0 | Status: SHIPPED | OUTPATIENT
Start: 2023-01-06 | End: 2023-01-13

## 2023-01-06 RX ORDER — SULFAMETHOXAZOLE AND TRIMETHOPRIM 800; 160 MG/1; MG/1
1 TABLET ORAL 2 TIMES DAILY
Qty: 14 TABLET | Refills: 0 | Status: SHIPPED | OUTPATIENT
Start: 2023-01-06 | End: 2023-01-13

## 2023-01-06 RX ORDER — CEPHALEXIN 250 MG/1
500 CAPSULE ORAL ONCE
Status: COMPLETED | OUTPATIENT
Start: 2023-01-06 | End: 2023-01-06

## 2023-01-06 RX ORDER — SULFAMETHOXAZOLE AND TRIMETHOPRIM 800; 160 MG/1; MG/1
1 TABLET ORAL ONCE
Status: COMPLETED | OUTPATIENT
Start: 2023-01-06 | End: 2023-01-06

## 2023-01-06 RX ADMIN — SULFAMETHOXAZOLE AND TRIMETHOPRIM 1 TABLET: 800; 160 TABLET ORAL at 14:39

## 2023-01-06 RX ADMIN — CEPHALEXIN 500 MG: 250 CAPSULE ORAL at 14:39

## 2023-01-06 ASSESSMENT — FIBROSIS 4 INDEX: FIB4 SCORE: 0.65

## 2023-01-06 NOTE — DISCHARGE INSTRUCTIONS
Antibiotics as prescribed.  Return to the emergency department for recheck in 48 hours.  He may need incision and drainage at that time.  Return sooner for pain or swelling or redness of the tibia fever or swelling that tracks up the finger.  Follow-up with your doctor

## 2023-01-06 NOTE — ED TRIAGE NOTES
Chief Complaint   Patient presents with    Wound Infection     Right middle distal finger infection from fake nails.    1 week of this infection

## 2023-01-06 NOTE — ED PROVIDER NOTES
ED Provider Note    CHIEF COMPLAINT  Chief Complaint   Patient presents with    Wound Infection     Right middle distal finger infection from fake nails.       EXTERNAL RECORDS REVIEWED  Previous ED records for comparison    HPI/ROS  LIMITATION TO HISTORY   Select: : None  OUTSIDE HISTORIAN(S):  None    Maggie Milligan is a 52 y.o. female who presents to the emergency department complaining of pain and swelling to her right third finger over the distal tip.  The patient had fake she cut her nail very short.  She developed infection around the base of the nail.  She is then developed swelling of the tip of her finger.  No significant tenderness at the base of the nail but there is swelling.  No fevers or chills.  Denies any comorbidities diabetes or infection risk factors.  Denies any other acute concerns or complaints.    PAST MEDICAL HISTORY   has a past medical history of Dental disorder, Heart burn, Indigestion, Other specified symptom associated with female genital organs, and Pain (06-13-13).    SURGICAL HISTORY   has a past surgical history that includes supracervical hysterectomy scope (11/7/2012); pelviscopy (6/18/2013); hysterectomy laparoscopy; and heaven by laparoscopy (1/14/2014).    FAMILY HISTORY  Family History   Problem Relation Age of Onset    Cancer Maternal Grandmother        SOCIAL HISTORY  Social History     Tobacco Use    Smoking status: Never    Smokeless tobacco: Never   Vaping Use    Vaping Use: Never used   Substance and Sexual Activity    Alcohol use: Yes     Comment: occ    Drug use: No    Sexual activity: Not on file       CURRENT MEDICATIONS  Home Medications       Reviewed by Conchita Davis R.N. (Registered Nurse) on 01/06/23 at 1404  Med List Status: Not Addressed     Medication Last Dose Status   LORazepam (ATIVAN) 1 MG Tab  Active   Omega-3 Fatty Acids (OMEGA 3 PO)  Active                    ALLERGIES  No Known Allergies    PHYSICAL EXAM  VITAL SIGNS: /77   Pulse 81   Temp  "36.8 °C (98.3 °F) (Temporal)   Resp 16   Ht 1.575 m (5' 2\")   Wt 73.8 kg (162 lb 11.2 oz)   LMP 10/29/2012   SpO2 99%   BMI 29.76 kg/m²    Constitutional: Well developed, Well nourished, No acute distress, Non-toxic appearance.   HENT: Normocephalic, Atraumatic,  Eyes: PERRL, EOMI, Conjunctiva normal, No discharge.   Neck: Normal range of motion, No tenderness, Supple, No stridor.   Musculoskeletal: Good range of motion in all major joints.  Right third finger swelling over the distal tip is mildly red.  There is some swelling around the base of the nail but no obvious paronychia to drain from.  Most the tenderness to the tip of the finger.  There is no fluctuance there is no firmness.  I suspect this is an early felon without a great target of pus to drain.  Could just be cellulitis.  Neurologic: Alert, No focal deficits noted.   Psychiatric: Affect normal      DIAGNOSTIC STUDIES / PROCEDURES  None    RADIOLOGY  I have independently interpreted the diagnostic imaging associated with this visit and am waiting the final reading from the radiologist.   none    COURSE & MEDICAL DECISION MAKING    ED Observation Status? No; Patient does not meet criteria for ED Observation.     INITIAL ASSESSMENT AND PLAN  Care Narrative: Patient presents with right hand third finger infection and swelling.  This is been present and worsening for couple of days likely as a result of skin and soft tissue infection from cutting her nail.  There is no obvious paronychia to drain.  I suspect she has an early felon but it is just not enough pus to come out for her to justify making an incision in her finger.    At this point I think it is reasonable to try her with antibiotics and have her return in 1 to 2 days for recheck she may require an I&D at that point.  There is a chance antibiotics may treat this infection sufficiently.  Patient verbalized understanding she is agreeable with this plan.  She states she cannot get her antibiotic " prescription until tomorrow because she is going from here to work so we will give her a dose now.  She will be prescribed Keflex and Bactrim.  She is to return for pain swelling or redness.  There is no signs of flexor tenosynovitis.  She given close return precautions for a felon, questions are answered and she is discharged in good condition.    ADDITIONAL PROBLEM LIST AND DISPOSITION    Problem #1: Finger infection, likely felon    I have discussed management of the patient with the following physicians and TRUONG's:  none    Discussion of management with other QHP or appropriate source(s): Select: None     Escalation of care considered, and ultimately not performed: acute inpatient care management, however at this time, the patient is most appropriate for outpatient management.     Barriers to care at this time, including but not limited to: .     Decision tools and prescription drugs considered including, but not limited to: Select: Antibiotics the patient was prescribed antibiotics as above. .    HTN/IDDM FOLLOW UP:  The patient is referred to a primary physician for blood pressure management, diabetic screening, and for all other preventive health concerns    Dwight Perez M.D.  [address] [address]  [facility] [address]  [phone]    Schedule an appointment as soon as possible for a visit in 3 days                  FINAL DIAGNOSIS  1. Finger infection    2. Felon of finger           Electronically signed by: Charlie Pascal M.D., 1/6/2023 2:28 PM